# Patient Record
Sex: MALE | Race: WHITE
[De-identification: names, ages, dates, MRNs, and addresses within clinical notes are randomized per-mention and may not be internally consistent; named-entity substitution may affect disease eponyms.]

---

## 2019-04-27 ENCOUNTER — HOSPITAL ENCOUNTER (EMERGENCY)
Dept: HOSPITAL 80 - FED | Age: 29
Discharge: HOME | End: 2019-04-27
Payer: MEDICAID

## 2019-04-27 VITALS — SYSTOLIC BLOOD PRESSURE: 129 MMHG | DIASTOLIC BLOOD PRESSURE: 92 MMHG

## 2019-04-27 DIAGNOSIS — L03.311: Primary | ICD-10-CM

## 2019-04-27 DIAGNOSIS — Z90.89: ICD-10-CM

## 2019-04-27 DIAGNOSIS — E86.9: ICD-10-CM

## 2019-04-27 LAB — PLATELET # BLD: 168 10^3/UL (ref 150–400)

## 2019-04-27 NOTE — EDPHY
H & P


Stated Complaint: R lower abd pain since this am


Time Seen by Provider: 04/27/19 16:47





- Medical/Surgical History


Hx Asthma: No


Hx Chronic Respiratory Disease: No


Hx Diabetes: No


Hx Cardiac Disease: No


Hx Renal Disease: No


Hx Cirrhosis: No


Hx Alcoholism: No


Hx HIV/AIDS: No


Hx Splenectomy or Spleen Trauma: No


Other PMH: appy 2000





- Social History


Smoking Status: Never smoked


Constitutional: 





 Initial Vital Signs











Temperature (C)  36.7 C   04/27/19 15:56


 


Heart Rate  87   04/27/19 15:56


 


Respiratory Rate  18   04/27/19 15:56


 


Blood Pressure  151/100 H  04/27/19 15:56


 


O2 Sat (%)  98   04/27/19 15:56








 











O2 Delivery Mode               Room Air














Allergies/Adverse Reactions: 


 





acetaminophen [From Tylenol] Allergy (Verified 04/27/19 15:56)


 








Home Medications: 














 Medication  Instructions  Recorded


 


Alprazolam  04/27/19














Medical Decision Making


ED Course/Re-evaluation: 





CHIEF COMPLAINT:  





HISTORY OF PRESENT ILLNESS:  must have 4 elements:  Location, Quality, Severity

, Duration, Timing, Context, Modifying Factors, Associated Signs and Symptoms





REVIEW OF SYSTEMS:  





A comprehensive 10 system review of systems is otherwise negative aside from 

elements mentioned in the history of present illness and medical decision 

making.





PHYSICAL EXAM:  





HR, BP, O2 Sat, RR.  Temp noted


General Appearance:  Alert, well hydrated, appropriate, and non-toxic appearing.


Head:  Atraumatic without scalp tenderness or obvious injury


Eyes:  Pupils equal, round, reactive to light and accommodation, EOMI, no trauma

, no injection.


Ears:  Clear bilaterally, no perforation, normal landmarks


Nose:  Atraumatic, no rhinorrhea, clear.


Throat:  There is no erythema or exudates, no lesions, normal tonsils, mucus 

membranes moist.


Neck:  Supple, 2+ carotid upstroke, nontender, no lymphadenopathy.


Respiratory:  No retractions, no distress, no wheezes, and no accessory muscle 

use.  Lungs are clear to auscultation bilaterally.


Cardiovascular:  Regular rate and rhythm, no murmurs, rubs, or gallops. 

Bilateral carotid, radial, dorsalis pedis, and posterior tibial pulses intact. 

Good capillary refill all extremities.


Gastrointestinal:  Abdomen is soft, nontender, non-distended, no masses, no 

rebound, no guarding, no peritoneal signs.


Musculoskeletal:  Normal active ROM of all extremities, atraumatic.


Neurological:  Alert, appropriate, and interactive.  The patient has normal 

DTRs and non-focal cranial nerves, motor, sensory, and cerebellar exam.


Skin:  No rashes, good turgor, no nodules on palpation.





Past medical history:


Past surgical history:


Family history:


Social history:





DIAGNOSTICS/PROCEDURES/CRITICAL CARE TIME:  








DIFFERENTIAL DIAGNOSIS:   





MEDICAL DECISION MAKING:  





Departure





- Departure


Referrals: 


NONE *PRIMARY CARE P,. [Primary Care Provider] - As per Instructions

## 2019-04-27 NOTE — EDPHY
H & P


Stated Complaint: R lower abd pain since this am


Time Seen by Provider: 04/27/19 16:47


HPI/ROS: 





CHIEF COMPLAINT: RLQ pain





HISTORY OF PRESENT ILLNESS: The patient is a 30 y/o male with a history of 

appendectomy and prior opiate abuse arriving from urgent care complaining of 

progressive RLQ pain since waking yesterday. His pain is primarily in his right 

lower quadrant and is significantly aggravated by any movement including bumps 

in the road on the ride over. He denies fever, nausea, vomiting, diarrhea, 

flank pain, hematuria, or dysuria. He had a naloxone implant in his right lower 

abdomen in December that he reports should have dissolved by now. He ha not 

wanted to eat or drink today.





REVIEW OF SYSTEMS:


A ten system review of systems was performed and is negative with the exception 

of the items mentioned in the HPI.





Past medical history: On alprazolam





Past surgical history: Appendectomy





Family history: Noncontributory





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 

Owns a cannabis business. 





General Appearance:  Alert.  Vital signs reviewed.  Blood pressure 151/100 in 

triage.


Eyes:  Pupils equal and round, no conjunctival injection, no discharge. 

Anicteric.


ENT, Mouth:  Mucous membranes are moist, no oropharyngeal erythema or edema.


Neck:  No lymphadenopathy, supple.


Respiratory:  Lungs are clear to auscultation; no wheezes, rales, or rhonchi.


Cardiovascular:  Regular rate and rhythm; no murmur, rub, or gallop.


Gastrointestinal:  Abdomen is soft severely tender in RLQ with guarding, no 

masses or organomegaly.


Skin:  Warm and dry, no rashes on exposed skin, normal color.


Back:  Nontender to palpation over the thoracolumbar spine. No CVAT.


Extremities:  No lower extremity edema, no calf tenderness or swelling.


Neurological:  Alert and oriented.  Moving all four extremities easily and 

equally.


Psychiatric:  Normal affect.





- Medical/Surgical History


Hx Asthma: No


Hx Chronic Respiratory Disease: No


Hx Diabetes: No


Hx Cardiac Disease: No


Hx Renal Disease: No


Hx Cirrhosis: No


Hx Alcoholism: No


Hx HIV/AIDS: No


Hx Splenectomy or Spleen Trauma: No


Other PMH: appy 2000





- Social History


Smoking Status: Never smoked


Constitutional: 


 Initial Vital Signs











Temperature (C)  36.7 C   04/27/19 15:56


 


Heart Rate  87   04/27/19 15:56


 


Respiratory Rate  18   04/27/19 15:56


 


Blood Pressure  151/100 H  04/27/19 15:56


 


O2 Sat (%)  98   04/27/19 15:56








 











O2 Delivery Mode               Room Air














Allergies/Adverse Reactions: 


 





acetaminophen [From Tylenol] Allergy (Verified 04/27/19 15:56)


 








Home Medications: 














 Medication  Instructions  Recorded


 


ALPRAZolam [Xanax 1 MG (*)] 1 mg PO TID PRN 04/27/19


 


Cephalexin [Keflex] 500 mg PO TID #20 cap 04/27/19


 


Doxycycline Hyclate [Doxycycline] 100 mg PO BID #13 cap 04/27/19














Medical Decision Making





- Diagnostics


Imaging: Discussed imaging studies w/ On call Radiologist, I viewed and 

interpreted images myself


ED Course/Re-evaluation: 


This is a 30 y/o male with a history of an appendectomy and opiate abuse who 

presents with a 1-day history of constant RLQ pain. He has  RLQ tenderness with 

guarding on exam. Plan for IV, labs, UA, abdominal CT, symptom management. 1L 

IV NS and 15mg IV Ketorolac ordered. 





CT shows small superficial fluid collection in right lower abdomen with 

inflammatory changes. 


CT reviewed by me.  Does not appear to be any fluid that could be drained.





In discussion with the patient, and in reexamination of his abdomen, I feel 

that he needs admission for pain control.  He will require antibiotics for what 

appears to be a cellulitis.  I spoke with him about his pain medication 

preferences.  He had little response to the Toradol.  He has a history of 

opiate abuse but feels that it is not a problem for him to receive opiate pain 

medication in this instance.  He is given Dilaudid 1 mg IV in the emergency 

department.





1gm IV Ceftriaxone ordered for cellulitis. Spoke with hospitalist service, Dr. Lopez accepts admission. 





1955: Reassessed patient. After receiving a dose of Dilaudid, he has decided he 

would like to leave the ED.  He is concerned because he has no one to help take 

care of his dog and he does not have insurance.  As expected, he is no longer 

experiencing debilitating abdominal pain.  He understands that he will not 

receive a prescription for opiate pain medication.  He tells me that that is 

fine with him.  Given his history of opiate abuse in the past I am concerned 

about this quick turnaround in his symptoms.  His abdomen remains mildly tender 

in the right lower quadrant, without guarding at the time of my final exam, 

just prior to his leaving the department.  He is given referrals to a primary 

care physician and [facility].  He is given prescriptions for doxycycline 

and Keflex.  Clear follow-up plan was outlined with him.  Danger signs that 

should prompt him to be re-evaluated were reviewed with him.





A search of [facility] reveals no prescriptions for opiates.  He has 

prescriptions for alprazolam, which he tells me he takes.





He is noted to be hypertensive in the department and is advised to have this 

followed up with a primary care physician.


Differential Diagnosis: 





I considered a differential diagnosis that includes but is not limited to 

cellulitis, abscess, bowel obstruction, ureterolithiasis, urinary tract 

infection, hernia, drug-seeking behavior.





- Data Points


Laboratory Results: 


 Laboratory Results





 04/27/19 17:25 





 04/27/19 17:25 








Medications Given: 


 








Discontinued Medications





Hydromorphone HCl (Dilaudid)  1 mg IVP EDNOW ONE


   Stop: 04/27/19 18:53


   Last Admin: 04/27/19 19:08 Dose:  1 mg


Sodium Chloride (Ns)  1,000 mls @ 0 mls/hr IV EDNOW ONE; Wide Open


   PRN Reason: Protocol


   Stop: 04/27/19 17:09


   Last Admin: 04/27/19 17:29 Dose:  1,000 mls


Ceftriaxone Sodium/Dextrose (Rocephin 1 Gm (Premix))  50 mls @ 100 mls/hr IV 

EDNOW ONE


   PRN Reason: Protocol


   Stop: 04/27/19 19:21


   Last Admin: 04/27/19 19:07 Dose:  50 mls


Ketorolac Tromethamine (Toradol)  15 mg IVP EDNOW ONE


   Stop: 04/27/19 17:09


   Last Admin: 04/27/19 17:30 Dose:  15 mg








Departure





- Departure


Disposition: Home, Routine, Self-Care


Clinical Impression: 


 Cellulitis





Condition: Good


Instructions:  Cephalexin (By mouth), Doxycycline (By mouth), Cellulitis (ED)


Additional Instructions: 


1. Take Keflex and doxycycline antibiotics as prescribed. Be sure to complete 

the entire prescription even if symptoms have resolved. Doxycycline will make 

your skin sensitive to the skin, avoid sun exposure while using this medication.


2. Follow up with the [facility] for reevaluation of your infection in the 

next 2-3 days without fail.


3. Take 600mg ibuprofen every 8 hours as needed for pain over the next few 

days. You received a similar medication here tonight and should wait until 

tomorrow to take another dose. 


4. Return to the ED for worsening of condition.





Referrals: 


[facility] (ED,. [Edm Groups for Call Sched] - 


NONE *PRIMARY CARE P,. [Primary Care Provider] - As per Instructions


Prescriptions: 


Cephalexin [Keflex] 500 mg PO TID #20 cap


Doxycycline Hyclate [Doxycycline] 100 mg PO BID #13 cap


Report Scribed for: Gardenia Kapadia


Report Scribed by: Glenna Rocha


Date of Report: 04/27/19


Time of Report: 18:09


Physician Review and Approval Statement: 





04/27/19 16:48


Portions of this note were transcribed by the medical scribe.  I, Dr. Gardenia Kapadia, personally performed the history, physical exam, and medical decision-

making; and confirmed the accuracy of the information in the transcribed note.